# Patient Record
Sex: MALE | Race: WHITE | NOT HISPANIC OR LATINO | Employment: FULL TIME | URBAN - METROPOLITAN AREA
[De-identification: names, ages, dates, MRNs, and addresses within clinical notes are randomized per-mention and may not be internally consistent; named-entity substitution may affect disease eponyms.]

---

## 2021-01-21 ENCOUNTER — NURSE TRIAGE (OUTPATIENT)
Dept: NURSING | Facility: CLINIC | Age: 26
End: 2021-01-21

## 2021-01-21 DIAGNOSIS — Z20.822 EXPOSURE TO COVID-19 VIRUS: Primary | ICD-10-CM

## 2021-01-21 NOTE — TELEPHONE ENCOUNTER
"Patient is calling requesting COVID serologic antibody testing.  NOTE: Serologic testing is a blood test for 'antibodies' which are made at 10-14 days after you have had symptoms of COVID or were exposed and had an asymptomatic infection.  This does NOT test you for 'active' infection or tell you if you are contagious.    Are you a healthcare worker? no  Do you currently have a cough, fever, body aches, shortness of breath, or difficulty breathing?  No  Did you previously have cough, fever, body aches, shortness of breath, or difficulty breathing that have now resolved? No previous covid symptoms.   Have you been exposed to (or come into close contact with) someone who tested POSITIVE for COVID-19 or someone who had a possible case of COVID-19?  Possible exposure 180 days ago.  Confirmed exposure > 14 days ago.  Lab order placed per SARS-CoV-2 Serology test Standing Order using indication \"Exposed to known COVID >14 days ago\" and diagnosis code  \"Exposure to COVID-19 Virus\" (Z20.828)      The patient was informed: \"Testing is limited each day and it may take time for testing to be available to everyone who has called. You will receive a call within 48-72 hours to schedule the serology testing. Please confirm the best number to reach you is [phone]. If you have any questions about scheduling, call [phone].\"     Jazlyn Eng RN  [facility]    "

## 2021-01-28 DIAGNOSIS — Z20.822 EXPOSURE TO COVID-19 VIRUS: ICD-10-CM

## 2021-01-28 PROCEDURE — 86769 SARS-COV-2 COVID-19 ANTIBODY: CPT | Performed by: PATHOLOGY

## 2021-01-28 PROCEDURE — 36415 COLL VENOUS BLD VENIPUNCTURE: CPT | Performed by: PATHOLOGY

## 2021-01-30 LAB
SARS-COV-2 AB PNL SERPL IA: NEGATIVE
SARS-COV-2 IGG SERPL IA-ACNC: NORMAL

## 2021-03-07 ENCOUNTER — HEALTH MAINTENANCE LETTER (OUTPATIENT)
Age: 26
End: 2021-03-07

## 2021-10-01 ENCOUNTER — LAB (OUTPATIENT)
Dept: URGENT CARE | Facility: URGENT CARE | Age: 26
End: 2021-10-01
Attending: PHYSICIAN ASSISTANT
Payer: COMMERCIAL

## 2021-10-01 ENCOUNTER — MYC MEDICAL ADVICE (OUTPATIENT)
Dept: FAMILY MEDICINE | Facility: CLINIC | Age: 26
End: 2021-10-01

## 2021-10-01 ENCOUNTER — TELEPHONE (OUTPATIENT)
Dept: FAMILY MEDICINE | Facility: CLINIC | Age: 26
End: 2021-10-01

## 2021-10-01 ENCOUNTER — VIRTUAL VISIT (OUTPATIENT)
Dept: FAMILY MEDICINE | Facility: CLINIC | Age: 26
End: 2021-10-01
Payer: COMMERCIAL

## 2021-10-01 DIAGNOSIS — R50.9 FEVER AND CHILLS: Primary | ICD-10-CM

## 2021-10-01 DIAGNOSIS — R50.9 FEVER AND CHILLS: ICD-10-CM

## 2021-10-01 DIAGNOSIS — J02.0 STREP THROAT: Primary | ICD-10-CM

## 2021-10-01 LAB
C PNEUM DNA SPEC QL NAA+PROBE: NOT DETECTED
DEPRECATED S PYO AG THROAT QL EIA: POSITIVE
FLUAV H1 2009 PAND RNA SPEC QL NAA+PROBE: NOT DETECTED
FLUAV H1 RNA SPEC QL NAA+PROBE: NOT DETECTED
FLUAV H3 RNA SPEC QL NAA+PROBE: NOT DETECTED
FLUAV RNA SPEC QL NAA+PROBE: NOT DETECTED
FLUBV RNA SPEC QL NAA+PROBE: NOT DETECTED
HADV DNA SPEC QL NAA+PROBE: NOT DETECTED
HCOV PNL SPEC NAA+PROBE: NOT DETECTED
HMPV RNA SPEC QL NAA+PROBE: NOT DETECTED
HPIV1 RNA SPEC QL NAA+PROBE: NOT DETECTED
HPIV2 RNA SPEC QL NAA+PROBE: NOT DETECTED
HPIV3 RNA SPEC QL NAA+PROBE: NOT DETECTED
HPIV4 RNA SPEC QL NAA+PROBE: NOT DETECTED
M PNEUMO DNA SPEC QL NAA+PROBE: NOT DETECTED
RSV RNA SPEC QL NAA+PROBE: NOT DETECTED
RSV RNA SPEC QL NAA+PROBE: NOT DETECTED
RV+EV RNA SPEC QL NAA+PROBE: NOT DETECTED

## 2021-10-01 PROCEDURE — 87633 RESP VIRUS 12-25 TARGETS: CPT | Performed by: FAMILY MEDICINE

## 2021-10-01 PROCEDURE — 87581 M.PNEUMON DNA AMP PROBE: CPT | Performed by: FAMILY MEDICINE

## 2021-10-01 PROCEDURE — U0003 INFECTIOUS AGENT DETECTION BY NUCLEIC ACID (DNA OR RNA); SEVERE ACUTE RESPIRATORY SYNDROME CORONAVIRUS 2 (SARS-COV-2) (CORONAVIRUS DISEASE [COVID-19]), AMPLIFIED PROBE TECHNIQUE, MAKING USE OF HIGH THROUGHPUT TECHNOLOGIES AS DESCRIBED BY CMS-2020-01-R: HCPCS | Performed by: FAMILY MEDICINE

## 2021-10-01 PROCEDURE — 87486 CHLMYD PNEUM DNA AMP PROBE: CPT | Performed by: FAMILY MEDICINE

## 2021-10-01 PROCEDURE — 99213 OFFICE O/P EST LOW 20 MIN: CPT | Mod: 95 | Performed by: PHYSICIAN ASSISTANT

## 2021-10-01 PROCEDURE — 87880 STREP A ASSAY W/OPTIC: CPT | Performed by: FAMILY MEDICINE

## 2021-10-01 PROCEDURE — U0005 INFEC AGEN DETEC AMPLI PROBE: HCPCS | Performed by: FAMILY MEDICINE

## 2021-10-01 RX ORDER — VALACYCLOVIR HYDROCHLORIDE 500 MG/1
TABLET, FILM COATED ORAL
COMMUNITY
Start: 2021-07-27 | End: 2021-10-01

## 2021-10-01 RX ORDER — PENICILLIN V POTASSIUM 500 MG/1
500 TABLET, FILM COATED ORAL 2 TIMES DAILY
Qty: 20 TABLET | Refills: 0 | Status: SHIPPED | OUTPATIENT
Start: 2021-10-01 | End: 2021-10-11

## 2021-10-01 NOTE — TELEPHONE ENCOUNTER
Patient calling clinic to state he is stuck in traffic and unable to make it in-person for appointment. RN advised change to video visit based on symptoms. He verbalizes understanding. Care team notified by OSMAN Hutchinson.

## 2021-10-01 NOTE — PROGRESS NOTES
davin is a 25 year old who is being evaluated via a billable video visit.      How would you like to obtain your AVS? MyChart  If the video visit is dropped, the invitation should be resent by: Text to cell phone: [phone]  Will anyone else be joining your video visit? No      Video Start Time: 1118    Assessment & Plan       ICD-10-CM    1. Fever and chills  R50.9 Streptococcus A Rapid Screen w/Reflex to PCR - Clinic Collect     Symptomatic COVID-19 Virus (Coronavirus) by PCR     Respiratory Panel PCR - NP Swab     Referred to testing site for RST. Patient fully vaccinated for COVID, so recommend re-testing as viral load may have not been high enough to get accurate result. Also discussed high rate of RSV and parainfluenza in the community, so will check respiratory panel at same time. Continue supportive cares while awaiting test results.    Return today (on 10/1/2021) for Lab Work.    PARTHA Vyas [facility]   davin is a 25 year old who presents for the following health issues     HPI       Concern for COVID-19  About how many days ago did these symptoms start? 4 days  Is this your first visit for this illness? Yes  In the 14 days before your symptoms started, have you had close contact with someone with COVID-19 (Coronavirus)? No - Pt had a neg covid test on Tuesday  Do you have a fever or chills? Yes, the highest temperature was 102.9 on Tuesday/Wednesday  Are you having new or worsening difficulty breathing? No  Do you have new or worsening cough? Yes - both productive and dry  Have you had any new or unexplained body aches? YES    Have you experienced any of the following NEW symptoms?    Headache: YES    Sore throat: YES    Loss of taste or smell: No    Chest pain: No    Diarrhea: YES    Rash: No  What treatments have you tried? Advil and dayquil and nyquil  Who do you live with? 2 roommates   Are you, or a household member, a healthcare worker or a  ? No  Do you live in a nursing home, group home, or shelter? No  Do you have a way to get food/medications if quarantined? Yes, I have a friend or family member who can help me.    Patient with fatigue 5 days ago, attributed it to poor sleep and being hungover. Developed HA, body aches, fever, ST, f/c/s, and loose stools the following day. Tested for COVID 3 days ago, which returned negative.     Review of Systems   Constitutional, HEENT, cardiovascular, pulmonary, GI, , musculoskeletal, neuro, skin, endocrine and psych systems are negative, except as otherwise noted.      Objective           Vitals:  No vitals were obtained today due to virtual visit.    Physical Exam   GENERAL: Healthy, alert and no distress  EYES: Eyes grossly normal to inspection.  No discharge or erythema, or obvious scleral/conjunctival abnormalities.  HENT: Normal cephalic/atraumatic.  External ears, nose and mouth without ulcers or lesions.  No nasal drainage visible.  NECK: No asymmetry, visible masses or scars  RESP: No audible wheeze, cough, or visible cyanosis.  No visible retractions or increased work of breathing.    MS: No gross musculoskeletal defects noted.  Normal range of motion.  No visible edema.  SKIN: Visible skin clear. No significant rash, abnormal pigmentation or lesions.  PSYCH: Mentation appears normal, affect normal/bright, judgement and insight intact, normal speech and appearance well-groomed.                Video-Visit Details    Type of service:  Video Visit    Video End Time:1128    Originating Location (pt. Location): Home    Distant Location (provider location):  [facility]     Platform used for Video Visit: [employer]

## 2021-10-02 LAB — SARS-COV-2 RNA RESP QL NAA+PROBE: NEGATIVE

## 2021-10-11 ENCOUNTER — HEALTH MAINTENANCE LETTER (OUTPATIENT)
Age: 26
End: 2021-10-11

## 2021-10-31 ENCOUNTER — OFFICE VISIT (OUTPATIENT)
Dept: URGENT CARE | Facility: URGENT CARE | Age: 26
End: 2021-10-31
Payer: COMMERCIAL

## 2021-10-31 ENCOUNTER — ANCILLARY PROCEDURE (OUTPATIENT)
Dept: GENERAL RADIOLOGY | Facility: CLINIC | Age: 26
End: 2021-10-31
Attending: FAMILY MEDICINE
Payer: COMMERCIAL

## 2021-10-31 VITALS
HEART RATE: 85 BPM | DIASTOLIC BLOOD PRESSURE: 72 MMHG | SYSTOLIC BLOOD PRESSURE: 136 MMHG | OXYGEN SATURATION: 100 % | RESPIRATION RATE: 16 BRPM | WEIGHT: 180 LBS

## 2021-10-31 DIAGNOSIS — S69.90XA THUMB INJURY, INITIAL ENCOUNTER: ICD-10-CM

## 2021-10-31 DIAGNOSIS — S69.92XA INJURY OF FINGER OF LEFT HAND, INITIAL ENCOUNTER: ICD-10-CM

## 2021-10-31 DIAGNOSIS — S62.522A CLOSED DISPLACED FRACTURE OF DISTAL PHALANX OF LEFT THUMB, INITIAL ENCOUNTER: Primary | ICD-10-CM

## 2021-10-31 PROCEDURE — 73140 X-RAY EXAM OF FINGER(S): CPT | Mod: LT | Performed by: RADIOLOGY

## 2021-10-31 PROCEDURE — 99204 OFFICE O/P NEW MOD 45 MIN: CPT | Performed by: FAMILY MEDICINE

## 2021-10-31 NOTE — PROGRESS NOTES
SUBJECTIVE:  Chief Complaint   Patient presents with     Finger     Pt fell last night and L thumb is now in pain    .ident presents with a chief complaint of left finger  first, third.  The injury occurred hours ago.   How: fall  immediate pain    No past medical history on file.  No Known Allergies  Social History     Tobacco Use     Smoking status: Never Smoker     Smokeless tobacco: Never Used   Substance Use Topics     Alcohol use: Not on file       ROSINTEGUMENTARY/SKIN: NEGATIVE for open wound/bleeding and POSITIVE for bruising    EXAM:/72   Pulse 85   Resp 16   Wt 81.6 kg (180 lb)   SpO2 100%  Gen: healthy,alert,no distress  Extremity: finger has pain with palpation and rom.   There is not compromise to the distal circulation.  Pulses are +2 and CRT is brisk.  EXTREMITIES: peripheral pulses normal  SKIN: no suspicious lesions or rashes  NEURO: Normal strength and tone, sensory exam grossly normal, mentation intact and speech normal    X-RAY with distal thumb fx, read by dr. Jose Alfredo Bowie      ICD-10-CM    1. Closed displaced fracture of distal phalanx of left thumb, initial encounter  S62.522A Orthopedic  Referral   2. Thumb injury, initial encounter  S69.90XA XR Finger Left G/E 2 Views     Orthopedic  Referral   3. Injury of finger of left hand, initial encounter  S69.92XA XR Finger Left G/E 2 Views     Orthopedic  Referral     Splint  OTC pain meds  RICE

## 2021-11-01 ENCOUNTER — OFFICE VISIT (OUTPATIENT)
Dept: ORTHOPEDICS | Facility: CLINIC | Age: 26
End: 2021-11-01
Payer: COMMERCIAL

## 2021-11-01 VITALS
BODY MASS INDEX: 26.05 KG/M2 | WEIGHT: 182 LBS | HEIGHT: 70 IN | DIASTOLIC BLOOD PRESSURE: 92 MMHG | SYSTOLIC BLOOD PRESSURE: 142 MMHG

## 2021-11-01 DIAGNOSIS — S69.90XA THUMB INJURY, INITIAL ENCOUNTER: ICD-10-CM

## 2021-11-01 DIAGNOSIS — S69.92XA INJURY OF FINGER OF LEFT HAND, INITIAL ENCOUNTER: ICD-10-CM

## 2021-11-01 DIAGNOSIS — S62.522A CLOSED DISPLACED FRACTURE OF DISTAL PHALANX OF LEFT THUMB, INITIAL ENCOUNTER: ICD-10-CM

## 2021-11-01 PROCEDURE — 99203 OFFICE O/P NEW LOW 30 MIN: CPT | Mod: 57 | Performed by: FAMILY MEDICINE

## 2021-11-01 PROCEDURE — 26750 TREAT FINGER FRACTURE EACH: CPT | Mod: LT | Performed by: FAMILY MEDICINE

## 2021-11-01 ASSESSMENT — MIFFLIN-ST. JEOR: SCORE: 1821.8

## 2021-11-01 NOTE — PATIENT INSTRUCTIONS
1. Thumb injury, initial encounter    2. Injury of finger of left hand, initial encounter    3. Closed displaced fracture of distal phalanx of left thumb, initial encounter      -Patient has left thumb pain due to a distal phalanx fracture  -Patient has significant pain with a thumb splint  -Patient was placed in a thumb spica cast.  Patient was given postprocedure instructions.  Patient will keep his hand elevated to decrease swelling of his thumb.  Patient is flying several times over the next couple of weeks which he may do but should keep his hand elevated is much as possible  -Patient may continue with Tylenol or Advil as needed.  -Patient will follow up in 2 weeks to be cut out of cast, repeat x-rays taken.  To consider new cast or thumb splint depending on x-rays and his pain level  -Call direct clinic number [[phone]] at any time with questions or concerns.    Albert Yeo MD [facility] and [facility] Medicine  [facility]         Caring for Your Cast     A cast is used to protect an injured body part and allow it to heal by limiting the amount of motion occurring around the injury. Pain and swelling of the injured area is normal for 48 hours after your cast is put on. If you have swelling, wiggle your toes or fingers to ease it. Doing so encourages blood flow to your arm or leg.     It is important that you keep your cast dry, unless your doctor tells you differently. If the padding of the cast gets wet, your skin may be damaged and become infected. When showering or taking a bath, put the cast in a heavy plastic bag that can be held in place with a rubber band. If your cast gets wet and does not dry out in four to five hours, call your doctor s office.   To keep the cast clean, use wash clothes or baby wipes around it.   You may experience some itching inside the cast. This is normal. Avoid putting anything in the cast, even your finger, as you can injure your skin and  cause infection. Try shaking some talcum powder or blowing cool air from a hair dryer into the cast to ease itching.   If these signs or symptoms develop, call your doctor immediately.       Pain gets worse     Swelling that cuts off blood flow that does not go away, even when you lift the body part above the level of your heart     Fever after itching. It may be related to an infection.     Fluid draining from your skin under the cast     Your cast may become loose as swelling goes down. If the cast feels too loose or if it is so loose you can take it off, call your doctor s office.     Your doctor or  will give you recommendations for activity based on your injury. Some sports allow casts if properly padded by a doctor or .     For complete healing, your cast should only be removed at the direction of your doctor or clinic staff. A special saw ensures its safe removal and protects the skin and other tissue under the cast.

## 2021-11-01 NOTE — LETTER
11/1/2021         RE: Bernard Lopez  [address]  [place] [address]        Dear Colleague,    Thank you for referring your patient, Bernard Lopez, to the [facility] [place]. Please see a copy of my visit note below.    ASSESSMENT & PLAN  Patient Instructions     1. Thumb injury, initial encounter    2. Injury of finger of left hand, initial encounter    3. Closed displaced fracture of distal phalanx of left thumb, initial encounter      -Patient has left thumb pain due to a distal phalanx fracture  -Patient has significant pain with a thumb splint  -Patient was placed in a thumb spica cast.  Patient was given postprocedure instructions.  Patient will keep his hand elevated to decrease swelling of his thumb.  Patient is flying several times over the next couple of weeks which he may do but should keep his hand elevated is much as possible  -Patient may continue with Tylenol or Advil as needed.  -Patient will follow up in 2 weeks to be cut out of cast, repeat x-rays taken.  To consider new cast or thumb splint depending on x-rays and his pain level  -Call direct clinic number [[phone]] at any time with questions or concerns.    Albert Yeo MD [facility]opedics and Sports Medicine  [facility]         Caring for Your Cast     A cast is used to protect an injured body part and allow it to heal by limiting the amount of motion occurring around the injury. Pain and swelling of the injured area is normal for 48 hours after your cast is put on. If you have swelling, wiggle your toes or fingers to ease it. Doing so encourages blood flow to your arm or leg.     It is important that you keep your cast dry, unless your doctor tells you differently. If the padding of the cast gets wet, your skin may be damaged and become infected. When showering or taking a bath, put the cast in a heavy plastic bag that can be held in place with a rubber band. If your  cast gets wet and does not dry out in four to five hours, call your doctor s office.   To keep the cast clean, use wash clothes or baby wipes around it.   You may experience some itching inside the cast. This is normal. Avoid putting anything in the cast, even your finger, as you can injure your skin and cause infection. Try shaking some talcum powder or blowing cool air from a hair dryer into the cast to ease itching.   If these signs or symptoms develop, call your doctor immediately.       Pain gets worse     Swelling that cuts off blood flow that does not go away, even when you lift the body part above the level of your heart     Fever after itching. It may be related to an infection.     Fluid draining from your skin under the cast     Your cast may become loose as swelling goes down. If the cast feels too loose or if it is so loose you can take it off, call your doctor s office.     Your doctor or  will give you recommendations for activity based on your injury. Some sports allow casts if properly padded by a doctor or .     For complete healing, your cast should only be removed at the direction of your doctor or clinic staff. A special saw ensures its safe removal and protects the skin and other tissue under the cast.       -----    SUBJECTIVE  Bernard Lopez is a/an 25 year old Right handed male who is seen in consultation at the request of  Jose Alfredo Bowie D.O. for evaluation of left hand pain. The patient is seen by themselves.    Onset: 2 day(s) ago. Patient describes injury as patient fell on outstretched arm  Location of Pain: left thumb and left middle finger  Rating of Pain at worst: 5/10  Rating of Pain Currently: 4/10  Worsened by: constant throbbing  Better with: rest  Treatments tried: splint  Associated symptoms: pain, swelling   Orthopedic history: NO  Relevant surgical history: NO  Social history: social history: works at [employer] - event [employer]     No past  medical history on file.  Social History     Socioeconomic History     Marital status: Single     Spouse name: Not on file     Number of children: Not on file     Years of education: Not on file     Highest education level: Not on file   Occupational History     Not on file   Tobacco Use     Smoking status: Never Smoker     Smokeless tobacco: Never Used   Substance and Sexual Activity     Alcohol use: Not on file     Drug use: Not on file     Sexual activity: Not on file   Other Topics Concern     Not on file   Social History Narrative     Not on file     Social Determinants of Health     Financial Resource Strain:      Difficulty of Paying Living Expenses:    Food Insecurity:      Worried About Running Out of Food in the Last Year:      Ran Out of Food in the Last Year:    Transportation Needs:      Lack of Transportation (Medical):      Lack of Transportation (Non-Medical):    Physical Activity:      Days of Exercise per Week:      Minutes of Exercise per Session:    Stress:      Feeling of Stress :    Social Connections:      Frequency of Communication with Friends and Family:      Frequency of Social Gatherings with Friends and Family:      Attends [employer] Services:      Active Member of Clubs or Organizations:      Attends Club or Organization Meetings:      Marital Status:    Intimate Partner Violence:      Fear of Current or Ex-Partner:      Emotionally Abused:      Physically Abused:      Sexually Abused:          Patient's past medical, surgical, social, and family histories were reviewed today and no changes are noted.    REVIEW OF SYSTEMS:  10 point ROS is negative other than symptoms noted above in HPI, Past Medical History or as stated below  Constitutional: NEGATIVE for fever, chills, change in weight  Skin: NEGATIVE for worrisome rashes, moles or lesions  GI/: NEGATIVE for bowel or bladder changes  Neuro: NEGATIVE for weakness, dizziness or paresthesias    OBJECTIVE:  BP (!) 142/92   Ht 1.778 m  "(5' 10\")   Wt 82.6 kg (182 lb)   BMI 26.11 kg/m     General: healthy, alert and in no distress  HEENT: no scleral icterus or conjunctival erythema  Skin: no suspicious lesions or rash. No jaundice.  CV: regular rhythm by palpation  Resp: normal respiratory effort without conversational dyspnea   Psych: normal mood and affect  Gait: normal steady gait with appropriate coordination and balance  Neuro: normal light touch sensory exam of the bilateral hands.    MSK:  LEFT HAND  Inspection:  Localized swelling of the thumb, ecchymosis over the volar aspect of the thumb  Palpation:   Carpals: normal   Metacarpals: normal   Thumb: proximal phalanx, distal phalanx   Fingers: normal  Range of Motion:    flexion PIP limited by tightness limited by pain  Strength:    flexion limited by pain, opposition limited by pain  Special Tests:    Positive: none        Independent visualization of the below image:  No results found for this or any previous visit (from the past 24 hour(s)).   EXAM: XR FINGER LEFT G/E 2 VIEWS  LOCATION: [facility]  DATE/TIME: 10/31/2021 2:50 PM     INDICATION:  Thumb injury, initial encounter  COMPARISON: None.                                                                      IMPRESSION: Acute fracture through the proximal shaft-metaphysis of the distal phalanx of the thumb with minimal impaction dorsally.    Cast/splint application    Date/Time: 11/1/2021 4:28 PM  Performed by: Yeo, Albert, MD  Authorized by: Yeo, Albert, MD     Consent:     Consent obtained:  Verbal    Consent given by:  Patient    Risks discussed:  Pain and swelling  Pre-procedure details:     Sensation:  Normal  Procedure details:     Laterality:  Left    Location:  Wrist    Wrist:  L wrist    Cast type:  Thumb spica    Supplies:  Fiberglass  Post-procedure details:     Pain:  Unchanged    Sensation:  Normal    Patient tolerance of procedure:  Tolerated well, no immediate complications    Patient provided with " cast or splint care instructions: Yes            Albert Yeo MD [facility]        Again, thank you for allowing me to participate in the care of your patient.        Sincerely,        Albert Yeo, MD

## 2021-11-01 NOTE — PROGRESS NOTES
ASSESSMENT & PLAN  Patient Instructions     1. Thumb injury, initial encounter    2. Injury of finger of left hand, initial encounter    3. Closed displaced fracture of distal phalanx of left thumb, initial encounter      -Patient has left thumb pain due to a distal phalanx fracture  -Patient has significant pain with a thumb splint  -Patient was placed in a thumb spica cast.  Patient was given postprocedure instructions.  Patient will keep his hand elevated to decrease swelling of his thumb.  Patient is flying several times over the next couple of weeks which he may do but should keep his hand elevated is much as possible  -Patient may continue with Tylenol or Advil as needed.  -Patient will follow up in 2 weeks to be cut out of cast, repeat x-rays taken.  To consider new cast or thumb splint depending on x-rays and his pain level  -Call direct clinic number [[phone]] at any time with questions or concerns.    Albert Yeo MD [facility] and Sports Medicine  [facility]         Caring for Your Cast     A cast is used to protect an injured body part and allow it to heal by limiting the amount of motion occurring around the injury. Pain and swelling of the injured area is normal for 48 hours after your cast is put on. If you have swelling, wiggle your toes or fingers to ease it. Doing so encourages blood flow to your arm or leg.     It is important that you keep your cast dry, unless your doctor tells you differently. If the padding of the cast gets wet, your skin may be damaged and become infected. When showering or taking a bath, put the cast in a heavy plastic bag that can be held in place with a rubber band. If your cast gets wet and does not dry out in four to five hours, call your doctor s office.   To keep the cast clean, use wash clothes or baby wipes around it.   You may experience some itching inside the cast. This is normal. Avoid putting anything in the cast, even your  finger, as you can injure your skin and cause infection. Try shaking some talcum powder or blowing cool air from a hair dryer into the cast to ease itching.   If these signs or symptoms develop, call your doctor immediately.       Pain gets worse     Swelling that cuts off blood flow that does not go away, even when you lift the body part above the level of your heart     Fever after itching. It may be related to an infection.     Fluid draining from your skin under the cast     Your cast may become loose as swelling goes down. If the cast feels too loose or if it is so loose you can take it off, call your doctor s office.     Your doctor or  will give you recommendations for activity based on your injury. Some sports allow casts if properly padded by a doctor or .     For complete healing, your cast should only be removed at the direction of your doctor or clinic staff. A special saw ensures its safe removal and protects the skin and other tissue under the cast.       -----    SUBJECTIVE  Bernard Lopez is a/an 25 year old Right handed male who is seen in consultation at the request of  Jose Alfredo Bowie D.O. for evaluation of left hand pain. The patient is seen by themselves.    Onset: 2 day(s) ago. 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  Social History     Socioeconomic History     Marital status: Single     Spouse name: Not on file     Number of children: Not on file     Years of education: Not on file     Highest education level: Not on file   Occupational History     Not on  "file   Tobacco Use     Smoking status: Never Smoker     Smokeless tobacco: Never Used   Substance and Sexual Activity     Alcohol use: Not on file     Drug use: Not on file     Sexual activity: Not on file   Other Topics Concern     Not on file   Social History Narrative     Not on file     Social Determinants of Health     Financial Resource Strain:      Difficulty of Paying Living Expenses:    Food Insecurity:      Worried About Running Out of Food in the Last Year:      Ran Out of Food in the Last Year:    Transportation Needs:      Lack of Transportation (Medical):      Lack of Transportation (Non-Medical):    Physical Activity:      Days of Exercise per Week:      Minutes of Exercise per Session:    Stress:      Feeling of Stress :    Social Connections:      Frequency of Communication with Friends and Family:      Frequency of Social Gatherings with Friends and Family:      Attends [employer] Services:      Active Member of Clubs or Organizations:      Attends Club or Organization Meetings:      Marital Status:    Intimate Partner Violence:      Fear of Current or Ex-Partner:      Emotionally Abused:      Physically Abused:      Sexually Abused:          Patient's past medical, surgical, social, and family histories were reviewed today and no changes are noted.    REVIEW OF SYSTEMS:  10 point ROS is negative other than symptoms noted above in HPI, Past Medical History or as stated below  Constitutional: NEGATIVE for fever, chills, change in weight  Skin: NEGATIVE for worrisome rashes, moles or lesions  GI/: NEGATIVE for bowel or bladder changes  Neuro: NEGATIVE for weakness, dizziness or paresthesias    OBJECTIVE:  BP (!) 142/92   Ht 1.778 m (5' 10\")   Wt 82.6 kg (182 lb)   BMI 26.11 kg/m     General: healthy, alert and in no distress  HEENT: no scleral icterus or conjunctival erythema  Skin: no suspicious lesions or rash. No jaundice.  CV: regular rhythm by palpation  Resp: normal respiratory effort " without conversational dyspnea   Psych: normal mood and affect  Gait: normal steady gait with appropriate coordination and balance  Neuro: normal light touch sensory exam of the bilateral hands.    MSK:  LEFT HAND  Inspection:  Localized swelling of the thumb, ecchymosis over the volar aspect of the thumb  Palpation:   Carpals: normal   Metacarpals: normal   Thumb: proximal phalanx, distal phalanx   Fingers: normal  Range of Motion:    flexion PIP limited by tightness limited by pain  Strength:    flexion limited by pain, opposition limited by pain  Special Tests:    Positive: none        Independent visualization of the below image:  No results found for this or any previous visit (from the past 24 hour(s)).   EXAM: XR FINGER LEFT G/E 2 VIEWS  LOCATION: [facility]  DATE/TIME: 10/31/2021 2:50 PM     INDICATION:  Thumb injury, initial encounter  COMPARISON: None.                                                                      IMPRESSION: Acute fracture through the proximal shaft-metaphysis of the distal phalanx of the thumb with minimal impaction dorsally.    Cast/splint application    Date/Time: 11/1/2021 4:28 PM  Performed by: Yeo, Albert, MD  Authorized by: Yeo, Albert, MD     Consent:     Consent obtained:  Verbal    Consent given by:  Patient    Risks discussed:  Pain and swelling  Pre-procedure details:     Sensation:  Normal  Procedure details:     Laterality:  Left    Location:  Wrist    Wrist:  L wrist    Cast type:  Thumb spica    Supplies:  Fiberglass  Post-procedure details:     Pain:  Unchanged    Sensation:  Normal    Patient tolerance of procedure:  Tolerated well, no immediate complications    Patient provided with cast or splint care instructions: Yes            Albert Yeo MD [facility] Orthopedic Care

## 2021-11-09 NOTE — PROGRESS NOTES
"ASSESSMENT & PLAN  Patient Instructions     1. Closed displaced fracture of distal phalanx of left thumb with routine healing, subsequent encounter      -Patient is following up for left thumb pain due to distal phalanx fracture  -Xrays of the left thumb shows persistent intra-articular with minimal callus formation present  -Patient was cut out of thumb spica cast today due to loosening.  -Patient was placed in a new thumb spica cast today. Patient was given cast care instructions  -Patient will follow up in 2 weeks to be cut in a cast and repeat x-rays taken.  -Call direct clinic number [[phone]] at any time with questions or concerns.    Albert Yeo MD [facility] Orthopedics and Sports Medicine  [facility]          -----    SUBJECTIVE:  Bernard Lopez is a 25 year old male who is seen in follow-up for left thumb pain.They were last seen 11/1/2021.    Since their last visit reports 50% improvement. They indicate that their current pain level is 0/10. They have tried casting/splinting/bracing.    States sometimes his thumb will stick out of his cast and he will bump it, but it is improving oveall    The patient is seen by themselves.    Patient's past medical, surgical, social, and family histories were reviewed today and no changes are noted.    REVIEW OF SYSTEMS:  Constitutional: NEGATIVE for fever, chills, change in weight  Skin: NEGATIVE for worrisome rashes, moles or lesions  GI/: NEGATIVE for bowel or bladder changes  Neuro: NEGATIVE for weakness, dizziness or paresthesias    OBJECTIVE:  /78   Ht 1.778 m (5' 10\")   Wt 82.6 kg (182 lb)   BMI 26.11 kg/m     General: healthy, alert and in no distress  HEENT: no scleral icterus or conjunctival erythema  Skin: no suspicious lesions or rash. No jaundice.  CV: regular rhythm by palpation, no pedal edema  Resp: normal respiratory effort without conversational dyspnea   Psych: normal mood and affect  Gait: normal steady gait with " appropriate coordination and balance  Neuro: normal light touch sensory exam of the extremities.    MSK:  LEFT HAND  Inspection:  minimal swelling of the thumb, no more ecchymosis over the volar aspect of the thumb  Palpation:   Carpals: normal   Metacarpals: normal   Thumb: proximal phalanx, distal phalanx, IP joint   Fingers: normal  Range of Motion:    flexion PIP limited by tightness limited by pain  Strength:    flexion limited by pain, opposition limited by pain  Special Tests:    Positive: none    Independent visualization of the below image:    Recent Results (from the past 24 hour(s))   XR Finger Left G/E 2 Views    Narrative    Acute fracture through the proximal shaft-metaphysis of the distal phalanx   of the thumb extending to IP joint with minimal callus formation present.    Fracture fragments virtually unchanged in position compared to previous   xray on 10/31/21.       Cast/splint application    Date/Time: 11/16/2021 1:21 PM  Performed by: Kathi Simon  Authorized by: Yeo, Albert, MD     Consent:     Consent obtained:  Verbal    Consent given by:  Patient    Risks discussed:  Swelling and pain  Pre-procedure details:     Sensation:  Normal  Procedure details:     Laterality:  Left    Location:  Wrist    Wrist:  L wrist    Strapping: no      Cast type:  Short arm and thumb spica    Supplies:  Fiberglass  Post-procedure details:     Pain:  Unchanged    Sensation:  Normal    Patient tolerance of procedure:  Tolerated well, no immediate complications    Patient provided with cast or splint care instructions: Yes          Patient's conditions were thoroughly discussed during today's visit with the patient and documentation being 28 minutes.    Albert Yeo MD, [facility] Sports and Orthopedic [facility]

## 2021-11-16 ENCOUNTER — OFFICE VISIT (OUTPATIENT)
Dept: ORTHOPEDICS | Facility: CLINIC | Age: 26
End: 2021-11-16
Payer: COMMERCIAL

## 2021-11-16 ENCOUNTER — ANCILLARY PROCEDURE (OUTPATIENT)
Dept: GENERAL RADIOLOGY | Facility: CLINIC | Age: 26
End: 2021-11-16
Attending: FAMILY MEDICINE
Payer: COMMERCIAL

## 2021-11-16 VITALS
BODY MASS INDEX: 26.05 KG/M2 | WEIGHT: 182 LBS | SYSTOLIC BLOOD PRESSURE: 129 MMHG | HEIGHT: 70 IN | DIASTOLIC BLOOD PRESSURE: 78 MMHG

## 2021-11-16 DIAGNOSIS — S62.522D CLOSED DISPLACED FRACTURE OF DISTAL PHALANX OF LEFT THUMB WITH ROUTINE HEALING, SUBSEQUENT ENCOUNTER: Primary | ICD-10-CM

## 2021-11-16 DIAGNOSIS — S62.522A CLOSED DISPLACED FRACTURE OF DISTAL PHALANX OF LEFT THUMB, INITIAL ENCOUNTER: ICD-10-CM

## 2021-11-16 PROCEDURE — 99207 PR FRACTURE CARE IN GLOBAL PERIOD: CPT | Performed by: FAMILY MEDICINE

## 2021-11-16 PROCEDURE — 73140 X-RAY EXAM OF FINGER(S): CPT | Mod: LT | Performed by: FAMILY MEDICINE

## 2021-11-16 PROCEDURE — 29075 APPL CST ELBW FNGR SHORT ARM: CPT | Mod: 58 | Performed by: FAMILY MEDICINE

## 2021-11-16 ASSESSMENT — MIFFLIN-ST. JEOR: SCORE: 1821.8

## 2021-11-16 NOTE — LETTER
"    11/16/2021         RE: Bernard Lopez  [address] [address]        Dear Colleague,    Thank you for referring your patient, Bernard Lopez, to the [facility] [place]. Please see a copy of my visit note below.    ASSESSMENT & PLAN  Patient Instructions     1. Closed displaced fracture of distal phalanx of left thumb with routine healing, subsequent encounter      -Patient is following up for left thumb pain due to distal phalanx fracture  -Xrays of the left thumb shows persistent intra-articular with minimal callus formation present  -Patient was cut out of thumb spica cast today due to loosening.  -Patient was placed in a new thumb spica cast today. Patient was given cast care instructions  -Patient will follow up in 2 weeks to be cut in a cast and repeat x-rays taken.  -Call direct clinic number [[phone]] at any time with questions or concerns.    Albert Yeo MD [facility] Orthopedics and Sports Medicine  [facility]          -----    SUBJECTIVE:  Bernard Lopez is a 25 year old male who is seen in follow-up for left thumb pain.They were last seen 11/1/2021.    Since their last visit reports 50% improvement. They indicate that their current pain level is 0/10. They have tried casting/splinting/bracing.    States sometimes his thumb will stick out of his cast and he will bump it, but it is improving oveall    The patient is seen by themselves.    Patient's past medical, surgical, social, and family histories were reviewed today and no changes are noted.    REVIEW OF SYSTEMS:  Constitutional: NEGATIVE for fever, chills, change in weight  Skin: NEGATIVE for worrisome rashes, moles or lesions  GI/: NEGATIVE for bowel or bladder changes  Neuro: NEGATIVE for weakness, dizziness or paresthesias    OBJECTIVE:  /78   Ht 1.778 m (5' 10\")   Wt 82.6 kg (182 lb)   BMI 26.11 kg/m     General: healthy, alert and in no distress  HEENT: no " scleral icterus or conjunctival erythema  Skin: no suspicious lesions or rash. No jaundice.  CV: regular rhythm by palpation, no pedal edema  Resp: normal respiratory effort without conversational dyspnea   Psych: normal mood and affect  Gait: normal steady gait with appropriate coordination and balance  Neuro: normal light touch sensory exam of the extremities.    MSK:  LEFT HAND  Inspection:  minimal swelling of the thumb, no more ecchymosis over the volar aspect of the thumb  Palpation:   Carpals: normal   Metacarpals: normal   Thumb: proximal phalanx, distal phalanx, IP joint   Fingers: normal  Range of Motion:    flexion PIP limited by tightness limited by pain  Strength:    flexion limited by pain, opposition limited by pain  Special Tests:    Positive: none    Independent visualization of the below image:    Recent Results (from the past 24 hour(s))   XR Finger Left G/E 2 Views    Narrative    Acute fracture through the proximal shaft-metaphysis of the distal phalanx   of the thumb extending to IP joint with minimal callus formation present.    Fracture fragments virtually unchanged in position compared to previous   xray on 10/31/21.       Cast/splint application    Date/Time: 11/16/2021 1:21 PM  Performed by: Kathi Simon  Authorized by: Yeo, Albert, MD     Consent:     Consent obtained:  Verbal    Consent given by:  Patient    Risks discussed:  Swelling and pain  Pre-procedure details:     Sensation:  Normal  Procedure details:     Laterality:  Left    Location:  Wrist    Wrist:  L wrist    Strapping: no      Cast type:  Short arm and thumb spica    Supplies:  Fiberglass  Post-procedure details:     Pain:  Unchanged    Sensation:  Normal    Patient tolerance of procedure:  Tolerated well, no immediate complications    Patient provided with cast or splint care instructions: Yes          Patient's conditions were thoroughly discussed during today's visit with the patient and documentation being 28  minutes.    Albert Yeo MD, [facility] and Orthopedic Care            Again, thank you for allowing me to participate in the care of your patient.        Sincerely,        Albert Yeo, MD

## 2021-11-16 NOTE — PATIENT INSTRUCTIONS
1. Closed displaced fracture of distal phalanx of left thumb with routine healing, subsequent encounter      -Patient is following up for left thumb pain due to distal phalanx fracture  -Xrays of the left thumb shows persistent intra-articular with minimal callus formation present  -Patient was cut out of thumb spica cast today due to loosening.  -Patient was placed in a new thumb spica cast today. Patient was given cast care instructions  -Patient will follow up in 2 weeks to be cut in a cast and repeat x-rays taken.  -Call direct clinic number [[phone]] at any time with questions or concerns.    Albert Yeo MD CAQSM  [place] [facility]  [facility] [place]

## 2021-11-22 NOTE — PROGRESS NOTES
"ASSESSMENT & PLAN  Patient Instructions     1. Closed displaced fracture of distal phalanx of left thumb with routine healing, subsequent encounter      -Patient following up for left thumb pain due to a distal phalanx fracture  -X-rays taken in office today show significant osseous healing compared to previous x-ray 2 weeks ago  -Patient was cut out of thumb spica cast and will begin gentle range of motion exercises at home  -Patient wishes to proceed with home exercise program at this time.  If he is unable to regain full range of motion and strength, he should call us for a formal hand therapy order  -Patient will follow up as needed  -Call direct clinic number [[phone]] at any time with questions or concerns.    Albert Yeo MD [facility] Orthopedics and Sports Medicine  [facility]          -----    SUBJECTIVE:  Bernard Lpoez is a 25 year old male who is seen in follow-up for left thumb fracture.They were last seen 11/16/2021. Patient states his thumb was feeling great until he went snowboarding on Friday, he fell once on his thumb while snowboarding, his thumb hurt over the weekend but feels ok now    Since their last visit reports 0% - (About the same as last time). They indicate that their current pain level is 0/10. They have tried casting/splinting/bracing.      The patient is seen by themselves.    Patient's past medical, surgical, social, and family histories were reviewed today and no changes are noted.    REVIEW OF SYSTEMS:  Constitutional: NEGATIVE for fever, chills, change in weight  Skin: NEGATIVE for worrisome rashes, moles or lesions  GI/: NEGATIVE for bowel or bladder changes  Neuro: NEGATIVE for weakness, dizziness or paresthesias    OBJECTIVE:  /78   Ht 1.778 m (5' 10\")   Wt 82.6 kg (182 lb)   BMI 26.11 kg/m     General: healthy, alert and in no distress  HEENT: no scleral icterus or conjunctival erythema  Skin: no suspicious lesions or rash. No " jaundice.  CV: regular rhythm by palpation, no pedal edema  Resp: normal respiratory effort without conversational dyspnea   Psych: normal mood and affect  Gait: normal steady gait with appropriate coordination and balance  Neuro: normal light touch sensory exam of the extremities.    MSK:  LEFT HAND  Inspection:  No swelling, dry scaling skin over the mid to distal thumb  Palpation:   Carpals: normal   Metacarpals: normal   Thumb: MCP and IP joint   Fingers: normal  Range of Motion:    flexion PIP and MCP limited by tightness limited by pain  Strength:    flexion limited by pain, opposition limited by pain  Special Tests:    Positive: none    Independent visualization of the below image:  Recent Results (from the past 24 hour(s))   XR Finger Left G/E 2 Views    Narrative    Fracture through the proximal shaft-metaphysis of the distal phalanx of   the thumb extending to IP joint with significant callus formation present   compared to previous x-ray performed on 11/16/2021.  Fracture line is only   visible at the cortex           Albert Yeo MD, [facility] and Orthopedic Care

## 2021-11-30 ENCOUNTER — OFFICE VISIT (OUTPATIENT)
Dept: ORTHOPEDICS | Facility: CLINIC | Age: 26
End: 2021-11-30
Payer: COMMERCIAL

## 2021-11-30 ENCOUNTER — ANCILLARY PROCEDURE (OUTPATIENT)
Dept: GENERAL RADIOLOGY | Facility: CLINIC | Age: 26
End: 2021-11-30
Attending: FAMILY MEDICINE
Payer: COMMERCIAL

## 2021-11-30 VITALS
WEIGHT: 182 LBS | BODY MASS INDEX: 26.05 KG/M2 | DIASTOLIC BLOOD PRESSURE: 78 MMHG | HEIGHT: 70 IN | SYSTOLIC BLOOD PRESSURE: 129 MMHG

## 2021-11-30 DIAGNOSIS — S62.522D CLOSED DISPLACED FRACTURE OF DISTAL PHALANX OF LEFT THUMB WITH ROUTINE HEALING, SUBSEQUENT ENCOUNTER: ICD-10-CM

## 2021-11-30 DIAGNOSIS — S62.522D CLOSED DISPLACED FRACTURE OF DISTAL PHALANX OF LEFT THUMB WITH ROUTINE HEALING, SUBSEQUENT ENCOUNTER: Primary | ICD-10-CM

## 2021-11-30 PROCEDURE — 73140 X-RAY EXAM OF FINGER(S): CPT | Mod: LT | Performed by: FAMILY MEDICINE

## 2021-11-30 PROCEDURE — 99207 PR FRACTURE CARE IN GLOBAL PERIOD: CPT | Performed by: FAMILY MEDICINE

## 2021-11-30 ASSESSMENT — MIFFLIN-ST. JEOR: SCORE: 1821.8

## 2021-11-30 NOTE — LETTER
11/30/2021         RE: Bernard Lopez  [address] [address]        Dear Colleague,    Thank you for referring your patient, Bernard Lopez, to the [facility] [place]. Please see a copy of my visit note below.    ASSESSMENT & PLAN  Patient Instructions     1. Closed displaced fracture of distal phalanx of left thumb with routine healing, subsequent encounter      -Patient following up for left thumb pain due to a distal phalanx fracture  -X-rays taken in office today show significant osseous healing compared to previous x-ray 2 weeks ago  -Patient was cut out of thumb spica cast and will begin gentle range of motion exercises at home  -Patient wishes to proceed with home exercise program at this time.  If he is unable to regain full range of motion and strength, he should call us for a formal hand therapy order  -Patient will follow up as needed  -Call direct clinic number [[phone]] at any time with questions or concerns.    Albert Yeo MD [facility] Orthopedics and Sports Medicine  [facility] [place]          -----    SUBJECTIVE:  Bernard Lopez is a 25 year old male who is seen in follow-up for left thumb fracture.They were last seen 11/16/2021. Patient states his thumb was feeling great until he went snowboarding on Friday, he fell once on his thumb while snowboarding, his thumb hurt over the weekend but feels ok now    Since their last visit reports 0% - (About the same as last time). They indicate that their current pain level is 0/10. They have tried casting/splinting/bracing.      The patient is seen by themselves.    Patient's past medical, surgical, social, and family histories were reviewed today and no changes are noted.    REVIEW OF SYSTEMS:  Constitutional: NEGATIVE for fever, chills, change in weight  Skin: NEGATIVE for worrisome rashes, moles or lesions  GI/: NEGATIVE for bowel or bladder changes  Neuro: NEGATIVE for weakness,  "dizziness or paresthesias    OBJECTIVE:  /78   Ht 1.778 m (5' 10\")   Wt 82.6 kg (182 lb)   BMI 26.11 kg/m     General: healthy, alert and in no distress  HEENT: no scleral icterus or conjunctival erythema  Skin: no suspicious lesions or rash. No jaundice.  CV: regular rhythm by palpation, no pedal edema  Resp: normal respiratory effort without conversational dyspnea   Psych: normal mood and affect  Gait: normal steady gait with appropriate coordination and balance  Neuro: normal light touch sensory exam of the extremities.    MSK:  LEFT HAND  Inspection:  No swelling, dry scaling skin over the mid to distal thumb  Palpation:   Carpals: normal   Metacarpals: normal   Thumb: MCP and IP joint   Fingers: normal  Range of Motion:    flexion PIP and MCP limited by tightness limited by pain  Strength:    flexion limited by pain, opposition limited by pain  Special Tests:    Positive: none    Independent visualization of the below image:  Recent Results (from the past 24 hour(s))   XR Finger Left G/E 2 Views    Narrative    Fracture through the proximal shaft-metaphysis of the distal phalanx of   the thumb extending to IP joint with significant callus formation present   compared to previous x-ray performed on 11/16/2021.  Fracture line is only   visible at the cortex           Albert Yeo MD, [facility] Orthopedic Care            Again, thank you for allowing me to participate in the care of your patient.        Sincerely,        Albert Yeo, MD    "

## 2021-11-30 NOTE — PATIENT INSTRUCTIONS
1. Closed displaced fracture of distal phalanx of left thumb with routine healing, subsequent encounter      -Patient following up for left thumb pain due to a distal phalanx fracture  -X-rays taken in office today show significant osseous healing compared to previous x-ray 2 weeks ago  -Patient was cut out of thumb spica cast and will begin gentle range of motion exercises at home  -Patient wishes to proceed with home exercise program at this time.  If he is unable to regain full range of motion and strength, he should call us for a formal hand therapy order  -Patient will follow up as needed  -Call direct clinic number [[phone]] at any time with questions or concerns.    Al[facility] MD [facility]  [facility] [place]

## 2022-03-27 ENCOUNTER — HEALTH MAINTENANCE LETTER (OUTPATIENT)
Age: 27
End: 2022-03-27

## 2022-09-25 ENCOUNTER — HEALTH MAINTENANCE LETTER (OUTPATIENT)
Age: 27
End: 2022-09-25

## 2023-05-08 ENCOUNTER — HEALTH MAINTENANCE LETTER (OUTPATIENT)
Age: 28
End: 2023-05-08

## 2024-05-11 ENCOUNTER — HEALTH MAINTENANCE LETTER (OUTPATIENT)
Age: 29
End: 2024-05-11